# Patient Record
Sex: FEMALE | Race: WHITE | Employment: FULL TIME | URBAN - METROPOLITAN AREA
[De-identification: names, ages, dates, MRNs, and addresses within clinical notes are randomized per-mention and may not be internally consistent; named-entity substitution may affect disease eponyms.]

---

## 2021-03-24 ENCOUNTER — OFFICE VISIT (OUTPATIENT)
Dept: INTERNAL MEDICINE CLINIC | Age: 31
End: 2021-03-24
Payer: COMMERCIAL

## 2021-03-24 VITALS
DIASTOLIC BLOOD PRESSURE: 50 MMHG | WEIGHT: 127 LBS | BODY MASS INDEX: 22.5 KG/M2 | SYSTOLIC BLOOD PRESSURE: 92 MMHG | HEART RATE: 56 BPM | HEIGHT: 63 IN

## 2021-03-24 DIAGNOSIS — I95.1 ORTHOSTATIC HYPOTENSION: ICD-10-CM

## 2021-03-24 DIAGNOSIS — L50.9 HIVES: ICD-10-CM

## 2021-03-24 DIAGNOSIS — G47.00 INSOMNIA, UNSPECIFIED TYPE: Primary | ICD-10-CM

## 2021-03-24 PROCEDURE — 90471 IMMUNIZATION ADMIN: CPT | Performed by: INTERNAL MEDICINE

## 2021-03-24 PROCEDURE — 99203 OFFICE O/P NEW LOW 30 MIN: CPT | Performed by: INTERNAL MEDICINE

## 2021-03-24 PROCEDURE — 90715 TDAP VACCINE 7 YRS/> IM: CPT | Performed by: INTERNAL MEDICINE

## 2021-03-24 SDOH — HEALTH STABILITY: MENTAL HEALTH: HOW MANY STANDARD DRINKS CONTAINING ALCOHOL DO YOU HAVE ON A TYPICAL DAY?: NOT ASKED

## 2021-03-24 ASSESSMENT — PATIENT HEALTH QUESTIONNAIRE - PHQ9
SUM OF ALL RESPONSES TO PHQ QUESTIONS 1-9: 0
SUM OF ALL RESPONSES TO PHQ QUESTIONS 1-9: 0

## 2021-03-24 NOTE — PROGRESS NOTES
[facility] Primary Care  Internal Medicine  New Patient Note  Mich Lima DO      3/24/2021    Rosalee Austin (:  1990) is a 32 y.o. female, here for evaluation of the following medical concerns:      SUBJECTIVE:    HPI     Patient is a 33 yo fm with no significant pmhx who presents today secondary to itching on her legs and to establish care. She notes Itching on her legs with welts following when she exercises. Goes away after 15-20 min. She doesn't do anything to to make it better. Outside this happens but not inside. Not only in the colder months. Legs and sometimes up to the stomach but not in hands. Notes this a lot more recently. She has also noted alcohol intolerance. She has hives to taquilla. Any time of beer getting headaches. She tries drinking a lot of water following this. Also notes some sleep issues. No problem falling alseep but has trouble staying asleep. eliminated caffeine . Stays away from blue light at night. Has overall good sleep hygiene. Severe tooth pain in the past. on the right side had tooth removed and has a bridge there. Was getting shotting pains in that area. Dentist adjusted something and has improved. Patient concerned it could come back. Patient also notes some intermittent light headedness when she changes position. Feels a little dizzy when this happens. Does not happen frequently. Last pap normal -   All pediatric immunizations  Unsure of last Tetanus shot    Review of Systems ROS negative except for those noted in the HPI above. Outpatient Medications Marked as Taking for the 3/24/21 encounter (Office Visit) with Johny Henao DO   Medication Sig Dispense Refill    levonorgestrel (MIRENA, 52 MG,) IUD 52 mg 1 each by Intrauterine route once          No Known Allergies    No past medical history on file.     Past Surgical History:   Procedure Laterality Date    TONSILLECTOMY         Social History     Socioeconomic History    Marital status:      Spouse name: Not on file    Number of children: Not on file    Years of education: Not on file    Highest education level: Not on file   Occupational History    Not on file   Social Needs    Financial resource strain: Not on file    Food insecurity     Worry: Not on file     Inability: Not on file    Transportation needs     Medical: Not on file     Non-medical: Not on file   Tobacco Use    Smoking status: Never Smoker    Smokeless tobacco: Never Used   Substance and Sexual Activity    Alcohol use: Yes    Drug use: Never    Sexual activity: Not on file   Lifestyle    Physical activity     Days per week: Not on file     Minutes per session: Not on file    Stress: Not on file   Relationships    Social connections     Talks on phone: Not on file     Gets together: Not on file     Attends Caodaism service: Not on file     Active member of club or organization: Not on file     Attends meetings of clubs or organizations: Not on file     Relationship status: Not on file    Intimate partner violence     Fear of current or ex partner: Not on file     Emotionally abused: Not on file     Physically abused: Not on file     Forced sexual activity: Not on file   Other Topics Concern    Not on file   Social History Narrative    Not on file        Family History   Problem Relation Age of Onset    Stroke Maternal Grandmother     Diabetes Maternal Grandfather     Diabetes Paternal Grandmother     Cancer Paternal Grandmother          OBJECTIVE:    Vitals:    03/24/21 0806   BP: (!) 92/50   Site: Right Upper Arm   Position: Sitting   Cuff Size: Small Adult   Pulse: 56   Weight: 127 lb (57.6 kg)   Height: 5' 2.5\" (1.588 m)     Body mass index is 22.86 kg/m². Physical Exam  Constitutional:       General: She is not in acute distress. Appearance: Normal appearance. She is not ill-appearing. HENT:      Head: Normocephalic and atraumatic.       Right Ear: External ear normal. Left Ear: External ear normal.   Eyes:      General: No scleral icterus. Extraocular Movements: Extraocular movements intact. Conjunctiva/sclera: Conjunctivae normal.   Neck:      Musculoskeletal: Normal range of motion. No neck rigidity or muscular tenderness. Cardiovascular:      Rate and Rhythm: Normal rate and regular rhythm. Pulses: Normal pulses. Heart sounds: No murmur. No friction rub. No gallop. Pulmonary:      Effort: Pulmonary effort is normal. No respiratory distress. Breath sounds: Normal breath sounds. No wheezing, rhonchi or rales. Abdominal:      General: Bowel sounds are normal. There is no distension. Palpations: Abdomen is soft. There is no mass. Tenderness: There is no abdominal tenderness. There is no guarding or rebound. Musculoskeletal: Normal range of motion. General: No swelling or tenderness. Lymphadenopathy:      Cervical: No cervical adenopathy. Skin:     General: Skin is warm. Coloration: Skin is not jaundiced. Findings: No bruising, erythema or rash. Neurological:      General: No focal deficit present. Mental Status: She is alert and oriented to person, place, and time. Motor: No weakness. Gait: Gait normal.   Psychiatric:         Mood and Affect: Mood normal.         Behavior: Behavior normal.         ASSESSMENT/PLAN:  1. Hives  Patient with hives following running/working out. Notes them on her legs sometimes up to the chest. Likely secondary to vasodilatation after working out. Patient can try some compression leggings to help with increased circulation. 2. Insomnia, unspecified type  Patient notes some difficulty staying asleep. She has no problem falling asleep.  She notes that she notices she starts making to do lists and other things in her head making it difficult to fall back asleep  - continue good bedtime hygiene  - try extending release melatonin OTC  - patient to let me know if no improvement     3. Baseline low blood pressure  Patient notes some dizziness on standing. Likely secondary to lower blood pressure . - patient educated on calf pumps, compressions socks  - slow to stand or sit  - increase salt in diet. 4. Need for Tdap  - given today. Return in about 1 year (around 3/24/2022).      The DO Markie

## 2022-03-28 ENCOUNTER — TELEPHONE (OUTPATIENT)
Dept: INTERNAL MEDICINE CLINIC | Age: 32
End: 2022-03-28

## 2022-05-05 NOTE — TELEPHONE ENCOUNTER
Attempted to contact patient to schedule overdue appointment. Mailbox full.    3rd attemtp/letter sent

## 2023-03-29 LAB — PAP SMEAR, EXTERNAL: NEGATIVE

## 2024-03-21 ENCOUNTER — OFFICE VISIT (OUTPATIENT)
Dept: INTERNAL MEDICINE CLINIC | Age: 34
End: 2024-03-21
Payer: COMMERCIAL

## 2024-03-21 VITALS
TEMPERATURE: 97.1 F | OXYGEN SATURATION: 99 % | WEIGHT: 133 LBS | HEART RATE: 64 BPM | SYSTOLIC BLOOD PRESSURE: 100 MMHG | HEIGHT: 63 IN | BODY MASS INDEX: 23.57 KG/M2 | DIASTOLIC BLOOD PRESSURE: 50 MMHG

## 2024-03-21 DIAGNOSIS — Z91.09 ENVIRONMENTAL ALLERGIES: ICD-10-CM

## 2024-03-21 DIAGNOSIS — Z76.89 ENCOUNTER TO ESTABLISH CARE: Primary | ICD-10-CM

## 2024-03-21 DIAGNOSIS — I95.1 ORTHOSTATIC HYPOTENSION: ICD-10-CM

## 2024-03-21 PROCEDURE — G8420 CALC BMI NORM PARAMETERS: HCPCS | Performed by: NURSE PRACTITIONER

## 2024-03-21 PROCEDURE — 1036F TOBACCO NON-USER: CPT | Performed by: NURSE PRACTITIONER

## 2024-03-21 PROCEDURE — 99204 OFFICE O/P NEW MOD 45 MIN: CPT | Performed by: NURSE PRACTITIONER

## 2024-03-21 PROCEDURE — G8427 DOCREV CUR MEDS BY ELIG CLIN: HCPCS | Performed by: NURSE PRACTITIONER

## 2024-03-21 PROCEDURE — G8484 FLU IMMUNIZE NO ADMIN: HCPCS | Performed by: NURSE PRACTITIONER

## 2024-03-21 SDOH — ECONOMIC STABILITY: HOUSING INSECURITY
IN THE LAST 12 MONTHS, WAS THERE A TIME WHEN YOU DID NOT HAVE A STEADY PLACE TO SLEEP OR SLEPT IN A SHELTER (INCLUDING NOW)?: NO

## 2024-03-21 SDOH — ECONOMIC STABILITY: FOOD INSECURITY: WITHIN THE PAST 12 MONTHS, YOU WORRIED THAT YOUR FOOD WOULD RUN OUT BEFORE YOU GOT MONEY TO BUY MORE.: NEVER TRUE

## 2024-03-21 SDOH — ECONOMIC STABILITY: FOOD INSECURITY: WITHIN THE PAST 12 MONTHS, THE FOOD YOU BOUGHT JUST DIDN'T LAST AND YOU DIDN'T HAVE MONEY TO GET MORE.: NEVER TRUE

## 2024-03-21 SDOH — ECONOMIC STABILITY: INCOME INSECURITY: HOW HARD IS IT FOR YOU TO PAY FOR THE VERY BASICS LIKE FOOD, HOUSING, MEDICAL CARE, AND HEATING?: NOT HARD AT ALL

## 2024-03-21 ASSESSMENT — ENCOUNTER SYMPTOMS
CONSTIPATION: 0
EYE DISCHARGE: 0
SHORTNESS OF BREATH: 0
ABDOMINAL PAIN: 0
COUGH: 0
WHEEZING: 0
NAUSEA: 0
VOMITING: 0
BLOOD IN STOOL: 0

## 2024-03-21 ASSESSMENT — PATIENT HEALTH QUESTIONNAIRE - PHQ9
2. FEELING DOWN, DEPRESSED OR HOPELESS: NOT AT ALL
SUM OF ALL RESPONSES TO PHQ9 QUESTIONS 1 & 2: 0
SUM OF ALL RESPONSES TO PHQ QUESTIONS 1-9: 0
1. LITTLE INTEREST OR PLEASURE IN DOING THINGS: NOT AT ALL
SUM OF ALL RESPONSES TO PHQ QUESTIONS 1-9: 0

## 2024-03-21 NOTE — PROGRESS NOTES
03/21/24    Maria R Lima  34 y.o.  female      Chief Complaint   Patient presents with    Establish Care         HPI:   Pt presents to establish care with me [facility] She has most recently been cared for by GYN as she has a son 18 months old    EYE exam: UTD  DENTAL: UTD  EXERCISE: 1/week with gym routine    BP: 100/50 with a hx of low numbers    Review of Systems   Constitutional:  Negative for fever.   HENT:  Negative for congestion.    Eyes:  Negative for discharge.   Respiratory:  Negative for cough, shortness of breath and wheezing.    Cardiovascular:  Negative for chest pain, palpitations and leg swelling.   Gastrointestinal:  Negative for abdominal pain, blood in stool, constipation, diarrhea, nausea and vomiting.   Genitourinary:  Negative for dysuria and hematuria.   Musculoskeletal:  Negative for myalgias.   Skin:  Negative for rash.        Sometimes has an itching rash on legs after being outside   Neurological:  Negative for dizziness.   Psychiatric/Behavioral:  The patient is not nervous/anxious.        Physical Exam  Constitutional:       General: She is not in acute distress.     Appearance: She is not diaphoretic.   HENT:      Right Ear: External ear normal.      Left Ear: External ear normal.      Mouth/Throat:      Pharynx: No oropharyngeal exudate.   Eyes:      General: No scleral icterus.        Right eye: No discharge.         Left eye: No discharge.   Neck:      Thyroid: No thyromegaly.   Cardiovascular:      Rate and Rhythm: Normal rate and regular rhythm.      Heart sounds: Normal heart sounds. No murmur heard.     No friction rub. No gallop.   Pulmonary:      Effort: Pulmonary effort is normal.      Breath sounds: Normal breath sounds. No wheezing.   Abdominal:      General: Bowel sounds are normal. There is no distension.      Palpations: Abdomen is soft.      Tenderness: There is no abdominal tenderness.   Musculoskeletal:         General: No tenderness. Normal range

## 2025-01-17 ENCOUNTER — OFFICE VISIT (OUTPATIENT)
Dept: INTERNAL MEDICINE CLINIC | Age: 35
End: 2025-01-17
Payer: COMMERCIAL

## 2025-01-17 VITALS
WEIGHT: 134 LBS | OXYGEN SATURATION: 100 % | SYSTOLIC BLOOD PRESSURE: 102 MMHG | TEMPERATURE: 98.2 F | BODY MASS INDEX: 24.12 KG/M2 | DIASTOLIC BLOOD PRESSURE: 60 MMHG

## 2025-01-17 DIAGNOSIS — J02.9 VIRAL PHARYNGITIS: Primary | ICD-10-CM

## 2025-01-17 PROCEDURE — G8420 CALC BMI NORM PARAMETERS: HCPCS

## 2025-01-17 PROCEDURE — 1036F TOBACCO NON-USER: CPT

## 2025-01-17 PROCEDURE — 99212 OFFICE O/P EST SF 10 MIN: CPT

## 2025-01-17 PROCEDURE — G8427 DOCREV CUR MEDS BY ELIG CLIN: HCPCS

## 2025-01-17 NOTE — PROGRESS NOTES
[facility] IM Office  [address]  [address]  [place] [id number]  Tel: [phone]    Maria R Lima  1990  female    CC:   Chief Complaint   Patient presents with    Cough     Cough six days  Sore throat       HPI:   Patient presents today for an acute visit for symptoms of sore throat, cough with production.    States has been ongoing since Sunday.  Started sinus congestion with minimal amount of drainage.  Progressed to cough with production of sputum.  Symptoms have been worse at night, states that last night her coughing was so severe that she had posttussive emesis.  Denies any fevers or chills.  No GI complaints.  Currently 16 weeks pregnant.    Past Medical History:   Diagnosis Date    Orthostatic hypotension        Past Surgical History:   Procedure Laterality Date    EYE SURGERY Bilateral     2022 Lasik    TONSILLECTOMY         Family History   Problem Relation Age of Onset    Substance Abuse Mother     Diabetes Brother     Stroke Maternal Grandmother     Diabetes Maternal Grandfather     Diabetes Paternal Grandmother     Cancer Paternal Grandmother         No Known Allergies    Immunization History   Administered Date(s) Administered    Influenza Virus Vaccine 11/05/2020    TDaP, ADACEL (age 10y-64y), BOOSTRIX (age 10y+), IM, 0.5mL 03/24/2021       No current outpatient medications on file.     No current facility-administered medications for this visit.       Review of Systems:   Otherwise negative unless stated in the HPI.    Physical Exam:   Vitals:    01/17/25 1008   BP: 102/60   Site: Right Upper Arm   Position: Sitting   Temp: 98.2 °F (36.8 °C)   TempSrc: Oral   SpO2: 100%   Weight: 60.8 kg (134 lb)     General-alert and oriented, no acute distress  Head-normocephalic, atraumatic  Eyes-EOMI, no icterus  Neck-no anterior posterior cervical adenopathy  ENT-oral mucosas moist and pink, minimal erythema around the oropharynx, no exudate  CV-regular rate and rhythm, no